# Patient Record
Sex: FEMALE | URBAN - METROPOLITAN AREA
[De-identification: names, ages, dates, MRNs, and addresses within clinical notes are randomized per-mention and may not be internally consistent; named-entity substitution may affect disease eponyms.]

---

## 2020-10-04 ENCOUNTER — HOSPITAL ENCOUNTER (OUTPATIENT)
Dept: CT IMAGING | Facility: HOSPITAL | Age: 60
Discharge: HOME | End: 2020-10-04
Attending: ORTHOPAEDIC SURGERY
Payer: COMMERCIAL

## 2020-10-04 DIAGNOSIS — M19.90 ARTHRITIS: ICD-10-CM

## 2020-10-04 PROCEDURE — 73700 CT LOWER EXTREMITY W/O DYE: CPT | Performed by: ORTHOPAEDIC SURGERY

## 2020-12-22 ENCOUNTER — LAB REQUISITION (OUTPATIENT)
Dept: LAB | Facility: HOSPITAL | Age: 60
End: 2020-12-22
Payer: COMMERCIAL

## 2020-12-22 DIAGNOSIS — M17.11 UNILATERAL PRIMARY OSTEOARTHRITIS, RIGHT KNEE: ICD-10-CM

## 2020-12-22 PROCEDURE — 87070 CULTURE OTHR SPECIMN AEROBIC: CPT | Performed by: ORTHOPAEDIC SURGERY

## 2020-12-22 PROCEDURE — 87075 CULTR BACTERIA EXCEPT BLOOD: CPT | Performed by: ORTHOPAEDIC SURGERY

## 2020-12-22 PROCEDURE — 87205 SMEAR GRAM STAIN: CPT | Performed by: ORTHOPAEDIC SURGERY

## 2023-12-02 ENCOUNTER — HOSPITAL ENCOUNTER (OUTPATIENT)
Age: 63
Discharge: HOME | End: 2023-12-02
Payer: COMMERCIAL

## 2023-12-02 VITALS
HEART RATE: 96 BPM | DIASTOLIC BLOOD PRESSURE: 85 MMHG | SYSTOLIC BLOOD PRESSURE: 145 MMHG | OXYGEN SATURATION: 96 % | TEMPERATURE: 99 F | RESPIRATION RATE: 18 BRPM

## 2023-12-02 DIAGNOSIS — R42 VERTIGO: Primary | ICD-10-CM

## 2023-12-02 DIAGNOSIS — R09.81 CONGESTION OF NASAL SINUS: ICD-10-CM

## 2023-12-02 LAB — SARS-COV-2 RNA RESP QL NAA+PROBE: NOT DETECTED

## 2023-12-02 RX ORDER — MECLIZINE HCL 12.5 MG/1
12.5 TABLET ORAL 3 TIMES DAILY PRN
Qty: 20 TABLET | Refills: 0 | Status: SHIPPED | OUTPATIENT
Start: 2023-12-02

## 2023-12-02 NOTE — ED INITIAL ASSESSMENT (HPI)
Pt states son was recently diagnosed with covid. Pt states having a bad HA on Sunday. Pt states has had some vertigo. Pt states does have a hx of vertigo after the covid vaccine.
negative...

## 2023-12-02 NOTE — DISCHARGE INSTRUCTIONS
Please take medication as prescribed. Close follow-up with your primary care provider is recommended. Any worsening symptoms please immediately go to the emergency department.

## 2024-05-11 ENCOUNTER — HOSPITAL ENCOUNTER (OUTPATIENT)
Age: 64
Discharge: HOME | End: 2024-05-11
Payer: COMMERCIAL

## 2024-05-11 VITALS
RESPIRATION RATE: 16 BRPM | DIASTOLIC BLOOD PRESSURE: 78 MMHG | SYSTOLIC BLOOD PRESSURE: 132 MMHG | HEART RATE: 81 BPM | TEMPERATURE: 99 F | OXYGEN SATURATION: 97 %

## 2024-05-11 DIAGNOSIS — L03.116 CELLULITIS OF LEFT LOWER EXTREMITY: Primary | ICD-10-CM

## 2024-05-11 PROCEDURE — 99213 OFFICE O/P EST LOW 20 MIN: CPT | Performed by: PHYSICIAN ASSISTANT

## 2024-05-11 RX ORDER — DOXYCYCLINE HYCLATE 100 MG/1
100 CAPSULE ORAL 2 TIMES DAILY
Qty: 14 CAPSULE | Refills: 0 | Status: SHIPPED | OUTPATIENT
Start: 2024-05-11 | End: 2024-05-18

## 2024-05-11 NOTE — ED PROVIDER NOTES
Chief Complaint   Patient presents with    Skin Problem       History obtained from: patient   services not used     HPI:     Stacia Salamanca is a 63 year old female who presents rash x 1 day. Patient states she had lipoma removed from left calf 2 days ago and when she removed the bandage today she noticed redness and warmth to surrounding skin. Patient states area is tender to touch. Patient states she has follow up appointment next week to have sutures removed. No medications taken or treatments tried. Denies injury/trama, numbness, weakness, drainage, bleeding, fevers, chills, difficulty ambulating.     PMH  Past Medical History:    MS (multiple sclerosis) ([facility])    Other and unspecified hyperlipidemia    Sarcoidosis       PFSH    PFSH asessment screens reviewed and agree.  Nurses notes reviewed I agree with documentation.    No family history on file.  Family history reviewed with patient/caregiver and is not pertinent to presenting problem.  Social History     Socioeconomic History    Marital status:      Spouse name: Not on file    Number of children: Not on file    Years of education: Not on file    Highest education level: Not on file   Occupational History    Not on file   Tobacco Use    Smoking status: Never    Smokeless tobacco: Not on file   Vaping Use    Vaping status: Never Used   Substance and Sexual Activity    Alcohol use: Yes     Comment: occasional    Drug use: No    Sexual activity: Not on file   Other Topics Concern     Service Not Asked    Blood Transfusions Not Asked    Caffeine Concern Yes     Comment: 1-2/day    Occupational Exposure Not Asked    Hobby Hazards Not Asked    Sleep Concern Not Asked    Stress Concern Not Asked    Weight Concern Not Asked    Special Diet Not Asked    Back Care Not Asked    Exercise No    Bike Helmet Not Asked    Seat Belt Not Asked    Self-Exams Not Asked   Social History Narrative    Not on file     Social Determinants of Health      Financial Resource Strain: Not on file   Food Insecurity: Not on file   Transportation Needs: Not on file   Physical Activity: Not on file   Stress: Not on file (2/8/2021)   Social Connections: Not on file   Housing Stability: At Risk (8/18/2023)    Received from [facility]     Living Situation: Not on file     Housing Problems: Not on file         ROS:   Positive for stated complaint: rash   All other systems reviewed and negative except as noted above.  Constitutional and Vital Signs Reviewed.    Physical Exam:     Findings:    /78   Pulse 81   Temp 98.9 °F (37.2 °C) (Temporal)   Resp 16   SpO2 97%   GENERAL: well developed, no acute distress, non-toxic appearing   SKIN: good skin turgor, surgical wound to posterior left lower leg with sutures and steri-strips intact, erythema and warmth to surrounding skin, minimal edema, no drainage or bleeding, no streaking erythema   HEAD: normocephalic, atraumatic  EYES: sclera non-icteric bilaterally, conjunctiva clear bilaterally  OROPHARYNX: MMM, pharynx clear, maintaining airway and secretions  NECK: no nuchal rigidity, no edema, phonation normal    CARDIO: regular rate, DP pulse 2+ bilaterally, cap refill < 2 sec   LUNGS: no increased WOB  EXTREMITIES: no cyanosis, MONTIEL without difficulty, BLE compartments soft and CMS intact, FROM   NEURO: no focal deficits  PSYCH: alert and oriented x3, answering questions appropriately, mood appropriate        MDM/Assessment/Plan:   Orders for this encounter:    Orders Placed This Encounter    doxycycline 100 MG Oral Cap     Sig: Take 1 capsule (100 mg total) by mouth 2 (two) times daily for 7 days.     Dispense:  14 capsule     Refill:  0       Labs performed this visit:  No results found for this or any previous visit (from the past 10 hour(s)).    Imaging performed this visit:  No orders to display       Medical Decision Making  DDx includes cellulitis versus erysipelas versus contact dermatitis versus  other.  Patient is overall very well-appearing with stable vitals.  No signs or symptoms of systemic illness.  No risk factors for blood clot.  No signs of neurovascular compromise or compartment syndrome. No bony tenderness. Symptoms localized to recent surgical wound, concerning for cellulitis. Rx doxycycline based on patient's documented allergies.  Patient has follow-up established with surgeon.  Discussed supportive care including rest, elevation, warm compress, and OTC Tylenol/Motrin as needed for pain.  Instructed patient to go directly to nearest ER with any worsening or concerning symptoms.  Follow-up with surgeon and/or PCP.    Risk  OTC drugs.  Prescription drug management.        Diagnosis:    ICD-10-CM    1. Cellulitis of left lower extremity  L03.116           All results reviewed and discussed with patient/patient's family. Patient/patient's family verbalize excellent understanding of instructions and feels comfortable with plan. All of patient's/patient's family's questions were addressed.   See AVS for detailed discharge instructions for your condition today.    Follow Up with:  Prasanth Olsen MD  [address] [facility]  [address] [place] [id number] [phone]            Note: This document was dictated using Dragon medical dictation software.  Proofreading was performed to the best of my ability, but errors may be present.    Kaylee Choi PA-C

## 2024-05-11 NOTE — ED INITIAL ASSESSMENT (HPI)
Pt got lipoma removed on L calf on Thursday, removed bandage today and noted redness around wound. Pt rated pain 6/10. Pt denied fevers.

## 2024-05-11 NOTE — DISCHARGE INSTRUCTIONS
Complete entire course of antibiotic as directed    Alternate Tylenol/Motrin as needed for pain   Drink plenty of water and get plenty of rest   Follow up with surgeon     
no